# Patient Record
Sex: MALE | Race: WHITE
[De-identification: names, ages, dates, MRNs, and addresses within clinical notes are randomized per-mention and may not be internally consistent; named-entity substitution may affect disease eponyms.]

---

## 2019-10-12 ENCOUNTER — HOSPITAL ENCOUNTER (OUTPATIENT)
Dept: HOSPITAL 92 - SCSRAD | Age: 24
Discharge: HOME | End: 2019-10-12
Attending: NURSE PRACTITIONER
Payer: SELF-PAY

## 2019-10-12 DIAGNOSIS — S89.91XA: Primary | ICD-10-CM

## 2019-10-12 NOTE — RAD
RIGHT KNEE FOUR VIEWS:

 

HISTORY:

Injury. Right knee pain.

 

FINDINGS:

No acute fracture or dislocation is identified.

 

If there is concern for meniscal or ligamentous injury further evaluation with MRI would be helpful.

 

POS: KRIS